# Patient Record
Sex: MALE | Race: WHITE | URBAN - METROPOLITAN AREA
[De-identification: names, ages, dates, MRNs, and addresses within clinical notes are randomized per-mention and may not be internally consistent; named-entity substitution may affect disease eponyms.]

---

## 2019-06-13 ENCOUNTER — TELEPHONE (OUTPATIENT)
Dept: SURGERY | Age: 44
End: 2019-06-13

## 2019-06-13 NOTE — TELEPHONE ENCOUNTER
FYI    Patient called wanting an appointment.  I explained the process so he is going to have his doctor send over path report with referral and he is going to  Take pics of the area in question and email them over to attach once we get the other information

## 2019-06-17 ENCOUNTER — OFFICE VISIT (OUTPATIENT)
Dept: SURGERY | Age: 44
End: 2019-06-17
Payer: COMMERCIAL

## 2019-06-17 DIAGNOSIS — D03.39 MELANOMA IN SITU OF CHEEK (HCC): Primary | ICD-10-CM

## 2019-06-17 PROCEDURE — 99203 OFFICE O/P NEW LOW 30 MIN: CPT | Performed by: DERMATOLOGY

## 2019-06-17 RX ORDER — ALBUTEROL SULFATE 90 UG/1
2 AEROSOL, METERED RESPIRATORY (INHALATION)
COMMUNITY
Start: 2017-10-30

## 2019-06-17 RX ORDER — FOLIC ACID 1 MG/1
1 TABLET ORAL
COMMUNITY
Start: 2019-01-24

## 2019-06-17 RX ORDER — CIPROFLOXACIN 500 MG/1
500 TABLET, FILM COATED ORAL
COMMUNITY
Start: 2019-06-14

## 2019-06-17 RX ORDER — OMEPRAZOLE 20 MG/1
40 TABLET, DELAYED RELEASE ORAL
COMMUNITY

## 2019-06-17 NOTE — PROGRESS NOTES
PRE-PROCEDURE SCREENING    Pacemaker/ICD: No  Difficulty with numbing in the past: No  Local Anesthesia Reaction/passing out: No  Latex or adhesive allergy:  No  Bleeding/Clotting Disorders: No  Anticoagulant Therapy: No  Joint prosthesis: No  Artificial Heart Valve: No  Stroke or Seizures: No  Organ Transplant or Lymphoma: No  Immunosuppression: No  Respiratory Problems: Asthma

## 2019-06-18 NOTE — PROGRESS NOTES
[facility]) Dermatology  [facility]. Julio Roblero, [address]  1975    40 y.o. male     Date of Visit: 6/17/2019    Chief Complaint:  Lesion on right preauricular bx proven c/w melanoma in situ. Referred by Dr. Naomi Johnson. History of Present Illness:  1. The patient presents today, referred by Dr. Steve Duff, to discuss management options of the MMIS on the right preauricular. The patient reports the following symptoms associated with the lesions:  none. The lesion has not been treated previously. Specific concerns the patient has today include:  Pt is here to discuss treatment procedure. Pathology shows c/w MMIS but due to fragmented nature of the bx specimen, the concern for potential deeper invasion is described in the microscopic exam.      Review of Systems:  Constitutional: Reports general sense of well-being. Skin: Denies any new or changing moles. No tendency to develop thick scars. Heme: Denies abnormal bleeding/bruising. Negative for adenopathy. Past Medical History, Surgical History, Family History, Medications and Allergies reviewed. Pre-procedure screen documented at today's visit by nursing staff reviewed and any contraindications to surgery noted below. Social History: nonsmoker    Physical Examination     Vitals: There were no vitals taken for this visit. Hanson Skin Type 2    -General: Well-appearing, NAD  1. On the right preauricular is a 7 x 6mm pink macule    Assessment and Plan     1. Melanoma in situ of right preauricular  [facility])        Given the clinical features, I recommend staged excision for this MMIS. Given pathologic exam, I dw the pt potential for debulking central specimen to show deeper invasion which may require larger margins, further staging and larger repair. The patient was informed of this recommendation, risks and benefits, alternatives to treatment, recovery and pre and post operative care.   The patient was given verbal information on the procedure as well as pre-operative and general post-operative care. No contraindications to surgery. 30 minutes spent with the patient, more than 50% was face to face discussing tx, reconstruction and coordination of care.

## 2019-06-24 ENCOUNTER — PROCEDURE VISIT (OUTPATIENT)
Dept: SURGERY | Age: 44
End: 2019-06-24
Payer: COMMERCIAL

## 2019-06-24 VITALS
SYSTOLIC BLOOD PRESSURE: 125 MMHG | DIASTOLIC BLOOD PRESSURE: 91 MMHG | OXYGEN SATURATION: 96 % | TEMPERATURE: 98 F | WEIGHT: 262 LBS | HEART RATE: 69 BPM

## 2019-06-24 DIAGNOSIS — D03.39 LENTIGO MALIGNA (MELANOMA IN SITU) OF CHEEK (HCC): Primary | ICD-10-CM

## 2019-06-24 PROCEDURE — 11642 EXC F/E/E/N/L MAL+MRG 1.1-2: CPT | Performed by: DERMATOLOGY

## 2019-06-24 NOTE — PROGRESS NOTES
PRE-PROCEDURE SCREENING    Pacemaker/ICD: No  Difficulty with numbing in the past: No  Local Anesthesia Reaction/passing out: No  Latex or adhesive allergy:  No  Bleeding/Clotting Disorders: No  Anticoagulant Therapy: No  Joint prosthesis: No  Artificial Heart Valve: No  Stroke or Seizures: No  Organ Transplant or Lymphoma: No  Immunosuppression: No  Respiratory Problems: Asthma
drawn and labelled and placed in the patient's chart as well as sent with the pathologic specimens. A pressure dressing was applied to the wound. The patient was given detailed oral and written instructions on home care and all questions were answered. The patient tolerated the procedure well without any complications. The patient left the office in stable condition. The patient is scheduled to return for Stage II or repair depending on pathology results.

## 2019-06-25 ENCOUNTER — PROCEDURE VISIT (OUTPATIENT)
Dept: SURGERY | Age: 44
End: 2019-06-25
Payer: COMMERCIAL

## 2019-06-25 VITALS
DIASTOLIC BLOOD PRESSURE: 87 MMHG | OXYGEN SATURATION: 97 % | SYSTOLIC BLOOD PRESSURE: 147 MMHG | HEART RATE: 70 BPM | WEIGHT: 262 LBS

## 2019-06-25 DIAGNOSIS — S01.80XA OPEN WOUND OF FACE, INITIAL ENCOUNTER: Primary | ICD-10-CM

## 2019-06-25 DIAGNOSIS — Z86.006 HISTORY OF MELANOMA IN SITU: ICD-10-CM

## 2019-06-25 LAB — DERMATOLOGY PATHOLOGY REPORT: ABNORMAL

## 2019-06-25 PROCEDURE — 12053 INTMD RPR FACE/MM 5.1-7.5 CM: CPT | Performed by: DERMATOLOGY

## 2019-06-26 NOTE — PROGRESS NOTES
DELAYED REPAIR INTRAOPERATIVE PROCEDURE NOTE    SURGEON:  Jeffrey Zavala. 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 OPERATIVE PROCEDURE:  DELAYED REPAIR    RECONSTRUCTION OF DEFECT: Intermediate layered closure    LOCATION: Right preauricular    SIZE OF DEFECT:20x18 MM    FINAL WOUND LENGTH: 79 MM    ANESTHESIA:  15    cc1% lidocaine with epinephrine 1:100,000 buffered      DURATION OF PROCEDURE: 30 MINUTES    POSTOPERATIVE OBSERVATION: 30 MINUTES    EBL: MINIMAL. SPECIMENS:  0    COMPLICATIONS: None    DESCRIPTION OF PROCEDURE:   REPAIR:  Various closure modalities were discussed with the patient, and it was decided that an intermediate layered repair would best preserve normal anatomic and functional relationships. Additional risk of wound dehiscence was discussed. The area was anesthetized with 1% lidocaine with epinephrine 1:100,000 buffered, was given a sterile prep using Chlorhexidine gluconate 4% solution and draped in the usual sterile fashion. Recreation and enlargement of the wound was performed by excising cones of tissue via the triangulation technique. The final incision lines were placed with respect for the patient's natural skin tension lines in a linear configuration to avoid functional and aesthetic distortion of adjacent free margins. Following minimal undermining, meticulous hemostasis was obtained with spot monopolar electrocoagulation. Subcutaneous dead space and dermis were closed using 5-0 Vicryl buried subcutaneous interrupted suture and the epidermis was approximated with 6-0 Fast absorbing gut running epidermal sutures. The area was cleansed with sterile saline. Petrolatum ointment was applied to the wound and a pressure dressing was applied.   Detailed post-care instructions were verbally reviewed with the patient and a handout given. The patient tolerated the procedure well without any complications. The patient left the office in good condition. The patient will return for suture removal/wound check as needed.

## 2020-05-05 ENCOUNTER — OFFICE VISIT (OUTPATIENT)
Dept: PRIMARY CARE CLINIC | Age: 45
End: 2020-05-05
Payer: COMMERCIAL

## 2020-05-05 PROCEDURE — 99213 OFFICE O/P EST LOW 20 MIN: CPT | Performed by: NURSE PRACTITIONER

## 2020-05-05 NOTE — PROGRESS NOTES
FLU/COVID-19 CLINIC EVALUATION  HPI:  Symptom duration, days:  [] 1   [] 2    []3   [] 4    []5    []6   [] 7    []8    []9   [] 10    []11 []  12   [] 13    []14 or greater    There were no vitals filed for this visit. Review of Systems   All other systems reviewed and are negative. Symptom course:  []Worsening        []Stable       [] Improving  []Fevers  Symptom (not measured)  Measured (Result: )  []Chills  []Cough  []Coughing up blood  []Chest Congestion  []Nasal Congestion  []Feeling short of breath  []Sometimes  []Frequently  []All the time  []Chest pain  []Headaches  []Tolerable  []Severe  []Sore throat  []Muscle aches  []Nausea  []Vomiting  []Unable to keep fluids down  []Diarrhea  []Severe  OTHER SYMPTOMS:      RISK FACTORS:  []Pregnant or possibly pregnant  []Age over 60  []Diabetes  []Heart disease  []Asthma  []COPD/Other chronic lung diseases  []Active Cancer  []On Chemotherapy  []Taking oral steroids  []History Lymphoma/Leukemia  []Close contact with a lab confirmed COVID-19 patient within 14 days of symptom onset  []History of travel from affected geographical areas within 14 days of symptom onset  []Health Care Worker Exposure no symptoms  []Health Care Worker Exposure symptomatic    Assessment :  []Alert      []Oriented to person/place/time  []No apparent distress      []Toxic appearing  []Breathing appears normal   []Appears tachypneic      []Speaking in full sentence    Physical Exam  Vitals signs reviewed. Constitutional:       Appearance: Normal appearance. HENT:      Head: Normocephalic and atraumatic. Mouth/Throat:      Mouth: Mucous membranes are moist.   Neck:      Musculoskeletal: Normal range of motion. Cardiovascular:      Rate and Rhythm: Normal rate. Pulses: Normal pulses. Pulmonary:      Effort: Pulmonary effort is normal.   Musculoskeletal: Normal range of motion. Skin:     General: Skin is warm and dry.    Neurological:      General: No focal deficit present. Mental Status: He is alert. Mental status is at baseline. Test ordered:    [x]COVID    _________  []Strep    ___________  []Flu       _________    Diagnosis:     []Flu  []Strep Throat  []Uncertain Viral Respiratory Illness  [x]Possible COVID-19  []Exposure UUSOO-12  []Other    PLAN:  []Referred to emergency department/ respiratory dept. for evaluation      Discharge home with verbal and or written instructions for:  [x]Preventing the spread of Coronavirus   []Flu management  []Strep throat management  []Possible COVID-19 exposure with self-quarantine, isolation instructions and management of symptoms  []Follow-up with primary care physician or emergency department if worsens  []Evaluation per physician/nurse practitioner in clinic      1. Suspected COVID-19 virus infection  COVID-19 swab complete at clinic and sent to lab for testing.  Quarantine order in place, patient verbalized understanding.    - COVID-19 Ambulatory

## 2020-05-07 LAB
SARS-COV-2: NOT DETECTED
SOURCE: NORMAL

## 2020-05-20 ENCOUNTER — OFFICE VISIT (OUTPATIENT)
Dept: PRIMARY CARE CLINIC | Age: 45
End: 2020-05-20
Payer: COMMERCIAL

## 2020-05-20 PROCEDURE — 99213 OFFICE O/P EST LOW 20 MIN: CPT | Performed by: NURSE PRACTITIONER

## 2020-05-22 LAB
SARS-COV-2: NOT DETECTED
SOURCE: NORMAL

## 2020-05-26 NOTE — RESULT ENCOUNTER NOTE
Your test for COVID-19, also known as novel coronavirus, came back negative. No virus was detected from the sample from your nose. Until your symptoms are fully resolved, you may still be contagious. We recommend that you remain isolated for 7 days minimum or 72 hours after your symptoms have completely resolved, whichever is longer. For example, if all symptoms improve after 2 days, you should still remain isolated for 7 days. If your symptoms get better after 10 days, you should remain isolated for 13 days. Continually monitor symptoms. Contact a medical provider if symptoms are worsening. If you have any additional questions, contact your doctor.     For additional information, please visit the Centers for Disease Control and Prevention ([employer]